# Patient Record
Sex: FEMALE | Race: WHITE | Employment: OTHER
[De-identification: names, ages, dates, MRNs, and addresses within clinical notes are randomized per-mention and may not be internally consistent; named-entity substitution may affect disease eponyms.]

---

## 2020-02-10 ENCOUNTER — HEALTH MAINTENANCE LETTER (OUTPATIENT)
Age: 65
End: 2020-02-10

## 2020-11-16 ENCOUNTER — HEALTH MAINTENANCE LETTER (OUTPATIENT)
Age: 65
End: 2020-11-16

## 2020-12-04 ENCOUNTER — OFFICE VISIT (OUTPATIENT)
Dept: PEDIATRICS | Facility: CLINIC | Age: 65
End: 2020-12-04
Payer: COMMERCIAL

## 2020-12-04 VITALS
OXYGEN SATURATION: 99 % | DIASTOLIC BLOOD PRESSURE: 85 MMHG | HEART RATE: 81 BPM | SYSTOLIC BLOOD PRESSURE: 130 MMHG | TEMPERATURE: 98.6 F | HEIGHT: 70 IN | BODY MASS INDEX: 22.65 KG/M2 | WEIGHT: 158.2 LBS

## 2020-12-04 DIAGNOSIS — Z13.6 CARDIOVASCULAR SCREENING; LDL GOAL LESS THAN 160: ICD-10-CM

## 2020-12-04 DIAGNOSIS — Z12.11 COLON CANCER SCREENING: ICD-10-CM

## 2020-12-04 DIAGNOSIS — Z13.29 SCREENING FOR THYROID DISORDER: ICD-10-CM

## 2020-12-04 DIAGNOSIS — Z11.4 SCREENING FOR HUMAN IMMUNODEFICIENCY VIRUS: ICD-10-CM

## 2020-12-04 DIAGNOSIS — H91.93 HEARING DEFICIT, BILATERAL: ICD-10-CM

## 2020-12-04 DIAGNOSIS — M85.80 OSTEOPENIA, UNSPECIFIED LOCATION: ICD-10-CM

## 2020-12-04 DIAGNOSIS — Z23 NEED FOR SHINGLES VACCINE: ICD-10-CM

## 2020-12-04 DIAGNOSIS — Z12.4 CERVICAL CANCER SCREENING: ICD-10-CM

## 2020-12-04 DIAGNOSIS — Z12.31 ENCOUNTER FOR SCREENING MAMMOGRAM FOR MALIGNANT NEOPLASM OF BREAST: ICD-10-CM

## 2020-12-04 DIAGNOSIS — E04.2 MULTIPLE THYROID NODULES: ICD-10-CM

## 2020-12-04 DIAGNOSIS — R73.01 ELEVATED FASTING GLUCOSE: ICD-10-CM

## 2020-12-04 DIAGNOSIS — Z13.0 SCREENING FOR DEFICIENCY ANEMIA: ICD-10-CM

## 2020-12-04 DIAGNOSIS — Z82.62 FAMILY HISTORY OF OSTEOPOROSIS: ICD-10-CM

## 2020-12-04 DIAGNOSIS — Z23 NEED FOR PNEUMOCOCCAL VACCINATION: ICD-10-CM

## 2020-12-04 DIAGNOSIS — M89.9 DISORDER OF BONE AND CARTILAGE: ICD-10-CM

## 2020-12-04 DIAGNOSIS — Z13.1 SCREENING FOR DIABETES MELLITUS: ICD-10-CM

## 2020-12-04 DIAGNOSIS — Z00.00 ROUTINE GENERAL MEDICAL EXAMINATION AT A HEALTH CARE FACILITY: ICD-10-CM

## 2020-12-04 DIAGNOSIS — Z13.1 SCREENING FOR DIABETES MELLITUS (DM): ICD-10-CM

## 2020-12-04 DIAGNOSIS — Z71.89 ADVANCE CARE PLANNING: ICD-10-CM

## 2020-12-04 DIAGNOSIS — Z78.0 MENOPAUSE: ICD-10-CM

## 2020-12-04 DIAGNOSIS — Z00.00 ENCOUNTER FOR MEDICARE ANNUAL WELLNESS EXAM: Primary | ICD-10-CM

## 2020-12-04 DIAGNOSIS — M94.9 DISORDER OF BONE AND CARTILAGE: ICD-10-CM

## 2020-12-04 DIAGNOSIS — Z83.3 FAMILY HISTORY OF DIABETES MELLITUS (DM): ICD-10-CM

## 2020-12-04 LAB
ALBUMIN SERPL-MCNC: 3.9 G/DL (ref 3.4–5)
ALP SERPL-CCNC: 74 U/L (ref 40–150)
ALT SERPL W P-5'-P-CCNC: 29 U/L (ref 0–50)
ANION GAP SERPL CALCULATED.3IONS-SCNC: 5 MMOL/L (ref 3–14)
AST SERPL W P-5'-P-CCNC: 22 U/L (ref 0–45)
BASOPHILS # BLD AUTO: 0 10E9/L (ref 0–0.2)
BASOPHILS NFR BLD AUTO: 0.8 %
BILIRUB SERPL-MCNC: 0.8 MG/DL (ref 0.2–1.3)
BUN SERPL-MCNC: 15 MG/DL (ref 7–30)
CALCIUM SERPL-MCNC: 8.9 MG/DL (ref 8.5–10.1)
CHLORIDE SERPL-SCNC: 106 MMOL/L (ref 94–109)
CHOLEST SERPL-MCNC: 230 MG/DL
CO2 SERPL-SCNC: 28 MMOL/L (ref 20–32)
CREAT SERPL-MCNC: 0.76 MG/DL (ref 0.52–1.04)
DIFFERENTIAL METHOD BLD: NORMAL
EOSINOPHIL # BLD AUTO: 0.1 10E9/L (ref 0–0.7)
EOSINOPHIL NFR BLD AUTO: 1.6 %
ERYTHROCYTE [DISTWIDTH] IN BLOOD BY AUTOMATED COUNT: 14.6 % (ref 10–15)
GFR SERPL CREATININE-BSD FRML MDRD: 81 ML/MIN/{1.73_M2}
GLUCOSE SERPL-MCNC: 104 MG/DL (ref 70–99)
HBA1C MFR BLD: 5.9 % (ref 0–5.6)
HCT VFR BLD AUTO: 43.6 % (ref 35–47)
HDLC SERPL-MCNC: 76 MG/DL
HGB BLD-MCNC: 14.2 G/DL (ref 11.7–15.7)
HIV 1+2 AB+HIV1 P24 AG SERPL QL IA: NONREACTIVE
IMM GRANULOCYTES # BLD: 0 10E9/L (ref 0–0.4)
IMM GRANULOCYTES NFR BLD: 0.4 %
LDLC SERPL CALC-MCNC: 140 MG/DL
LYMPHOCYTES # BLD AUTO: 1.5 10E9/L (ref 0.8–5.3)
LYMPHOCYTES NFR BLD AUTO: 29.8 %
MCH RBC QN AUTO: 27.7 PG (ref 26.5–33)
MCHC RBC AUTO-ENTMCNC: 32.6 G/DL (ref 31.5–36.5)
MCV RBC AUTO: 85 FL (ref 78–100)
MONOCYTES # BLD AUTO: 0.5 10E9/L (ref 0–1.3)
MONOCYTES NFR BLD AUTO: 9.5 %
NEUTROPHILS # BLD AUTO: 2.9 10E9/L (ref 1.6–8.3)
NEUTROPHILS NFR BLD AUTO: 57.9 %
NONHDLC SERPL-MCNC: 154 MG/DL
PLATELET # BLD AUTO: 208 10E9/L (ref 150–450)
POTASSIUM SERPL-SCNC: 3.9 MMOL/L (ref 3.4–5.3)
PROT SERPL-MCNC: 7.1 G/DL (ref 6.8–8.8)
RBC # BLD AUTO: 5.12 10E12/L (ref 3.8–5.2)
SODIUM SERPL-SCNC: 139 MMOL/L (ref 133–144)
TRIGL SERPL-MCNC: 71 MG/DL
TSH SERPL DL<=0.005 MIU/L-ACNC: 2.37 MU/L (ref 0.4–4)
WBC # BLD AUTO: 5 10E9/L (ref 4–11)

## 2020-12-04 PROCEDURE — 87389 HIV-1 AG W/HIV-1&-2 AB AG IA: CPT | Performed by: FAMILY MEDICINE

## 2020-12-04 PROCEDURE — 85025 COMPLETE CBC W/AUTO DIFF WBC: CPT | Performed by: FAMILY MEDICINE

## 2020-12-04 PROCEDURE — 36415 COLL VENOUS BLD VENIPUNCTURE: CPT | Performed by: FAMILY MEDICINE

## 2020-12-04 PROCEDURE — 80061 LIPID PANEL: CPT | Performed by: FAMILY MEDICINE

## 2020-12-04 PROCEDURE — 83036 HEMOGLOBIN GLYCOSYLATED A1C: CPT | Performed by: FAMILY MEDICINE

## 2020-12-04 PROCEDURE — 80053 COMPREHEN METABOLIC PANEL: CPT | Performed by: FAMILY MEDICINE

## 2020-12-04 PROCEDURE — 84443 ASSAY THYROID STIM HORMONE: CPT | Performed by: FAMILY MEDICINE

## 2020-12-04 PROCEDURE — G0402 INITIAL PREVENTIVE EXAM: HCPCS | Performed by: FAMILY MEDICINE

## 2020-12-04 ASSESSMENT — MIFFLIN-ST. JEOR: SCORE: 1334.9

## 2020-12-04 NOTE — PATIENT INSTRUCTIONS
Get the vision screening done today  Schedule for DEXA, THYROID US and mammogram  Get the labs- FIT kit today    Patient Education   Personalized Prevention Plan  You are due for the preventive services outlined below.  Your care team is available to assist you in scheduling these services.  If you have already completed any of these items, please share that information with your care team to update in your medical record.  Health Maintenance Due   Topic Date Due     Discuss Advance Care Planning  1955     HIV Screening  02/07/1970     Zoster (Shingles) Vaccine (1 of 2) 02/07/2005     Colorectal Cancer Screening  12/01/2018     Mammogram  12/08/2018     PHQ-2  01/01/2020     FALL RISK ASSESSMENT  02/07/2020     Pneumococcal Vaccine (1 of 1 - PPSV23) 02/07/2020     Flu Vaccine (1) 09/01/2020        Patient Education     Zoster Vaccine, Recombinant injection  Brand Name: SHINGRIX  What is this medicine?  ZOSTER VACCINE (ZOS ter vak SEEN) is used to prevent shingles in adults 50 years old and over. This vaccine is not used to treat shingles or nerve pain from shingles.  How should I use this medicine?  This vaccine is for injection in a muscle. It is given by a health care professional.  Talk to your pediatrician regarding the use of this medicine in children. This medicine is not approved for use in children.  What side effects may I notice from receiving this medicine?  Side effects that you should report to your doctor or health care professional as soon as possible:    allergic reactions like skin rash, itching or hives, swelling of the face, lips, or tongue    breathing problems  Side effects that usually do not require medical attention (report these to your doctor or health care professional if they continue or are bothersome):    chills    headache    fever    nausea, vomiting    redness, warmth, pain, swelling or itching at site where injected    tiredness  What may interact with this  medicine?      medicines that suppress your immune system    medicines to treat cancer    steroid medicines like prednisone or cortisone  What if I miss a dose?  Keep appointments for follow-up (booster) doses as directed. It is important not to miss your dose. Call your doctor or health care professional if you are unable to keep an appointment.  Where should I keep my medicine?  This vaccine is only given in a clinic, pharmacy, doctor's office, or other health care setting and will not be stored at home.  What should I tell my health care provider before I take this medicine?  They need to know if you have any of these conditions:    blood disorders or disease    cancer like leukemia or lymphoma    immune system problems or therapy    an unusual or allergic reaction to vaccines, other medications, foods, dyes, or preservatives    pregnant or trying to get pregnant    breast-feeding  What should I watch for while using this medicine?  Visit your doctor for regular check ups.  This vaccine, like all vaccines, may not fully protect everyone.  NOTE:This sheet is a summary. It may not cover all possible information. If you have questions about this medicine, talk to your doctor, pharmacist, or health care provider. Copyright  2020 [employer]           Patient Education     Pneumococcal Vaccine  Pneumococcal disease    Pneumococcal disease is caused by bacteria (Streptococcus pneumoniae). This germ is easily spread when someone with the bacteria coughs, sneezes, laughs, or talks. You can get this disease more than once. This is because there are many types (strains) of the bacteria. Some strains are also resistant to treatment with antibiotics.  There are different kinds of pneumococcal disease. What kind you have depends on what part of the body is infected. They include:    Pneumonia. This is an infection in the lungs.    Meningitis. This is an infection of the covering of the brain and spinal cord.    Otitis media.  This is an infection of the middle ear.    Bacteremia or septicemia. This is an infection in the blood.  Anyone can get pneumococcal disease, but it can be life-threatening for people in high-risk groups. Thousands of people die from this disease each year. Thousands more become seriously ill.  The vaccine  Those at high risk for pneumococcal disease include:    People 65 and older    Infants younger than 2 years    People with long-term (chronic) health problems such as diabetes, lung or heart disease, liver disease, sickle cell disease, or alcoholism    People who have a cochlear implant    People who have a weakened immune system from cancer, HIV, or medicines    People who live in nursing homes or other long-term-care facilities    People who smoke or have asthma   For people at risk, the pneumococcal vaccines are the best way to keep from getting the disease. The vaccine comes in two types. The vaccine that s right for you depends on your risk factors and your age. Both vaccines are safe and work well Talk with your healthcare provider about which vaccine is best for you and when you should get it. The vaccines are given as shots (injections). This can be done at your healthcare provider's office or a health clinic. Drugstores, senior centers, and workplaces also often offer vaccines. Ask your healthcare provider if you have questions.  They are given as follows, or as directed by your healthcare provider:    Infants to children younger than 2 years. The CDC recommends a 4-dose series given 2 to several months apart.      Ages 2 to 64 years diagnosed with certain health conditions.    Adults 19 to 64 years who are cigarette smokers or who have certain health conditions. Your healthcare provider will tell you the number of doses you need based on your health condition.    Adults 65 years and older. If you have never had a pneumococcal vaccine or don t know if you have had a vaccine, you may need 2 vaccinations a  year apart. If you had a vaccination before you were 65 years old, ask your healthcare provider to tell you the number of doses you need and when you should have them.  [employer] last reviewed this educational content on 11/1/2016 2000-2020 [employer]. [address] [place] [address], [place] [address]. All rights reserved. This information is not intended as a substitute for professional medical care. Always follow your healthcare professional's instructions.

## 2020-12-04 NOTE — PROGRESS NOTES
"  SUBJECTIVE:   Angelique Anaya is a 65 year old female who presents for Preventive Visit.  Patient was last seen by this provider in 2016  She is here to reestablish care and for annual wellness exam  Patient was followed up at the [facility] in the past 3 years      Patient has been advised of split billing requirements and indicates understanding: Yes  Are you in the first 12 months of your Medicare Part B coverage?  Yes,  Visual Acuity:  Right Eye: 10/20   Left Eye: 10/25  Both Eyes: 10/25    Physical Health:    In general, how would you rate your overall physical health? good    Outside of work, how many days during the week do you exercise? 4-5 days/week    Outside of work, approximately how many minutes a day do you exercise?45-60 minutes    If you drink alcohol do you typically have >3 drinks per day or >7 drinks per week? No    Do you usually eat at least 4 servings of fruit and vegetables a day, include whole grains & fiber and avoid regularly eating high fat or \"junk\" foods? Yes    Do you have any problems taking medications regularly?  No    Do you have any side effects from medications? none    Needs assistance for the following daily activities: no assistance needed    Which of the following safety concerns are present in your home?  none identified     Hearing impairment: Yes, Difficulty following a conversation in a noisy restaurant or crowded room.    In the past 6 months, have you been bothered by leaking of urine? no    Mental Health:    In general, how would you rate your overall mental or emotional health? good  PHQ-2 Score:      Do you feel safe in your environment? Yes    Have you ever done Advance Care Planning? (For example, a Health Directive, POLST, or a discussion with a medical provider or your loved ones about your wishes): Yes, patient states has an Advance Care Planning document and will bring a copy to the clinic.    Additional concerns to address?  No    Fall " "risk:  Fallen 2 or more times in the past year?: No  Any fall with injury in the past year?: No  click delete button to remove this line now  Cognitive Screenin) Repeat 3 items (Leader, Season, Table)    2) Clock draw:  :00966::\"NORMAL\"}  3) 3 item recall: Recalls 3 objects  Results: 3 items recalled: COGNITIVE IMPAIRMENT LESS LIKELY    Mini-CogTM Copyright PRICE Thurman. Licensed by the author for use in [facility]; reprinted with permission (jessee@[facility]). All rights reserved.      Do you have sleep apnea, excessive snoring or daytime drowsiness?: no            Reviewed and updated as needed this visit by clinical staff                 Reviewed and updated as needed this visit by Provider                Social History     Tobacco Use     Smoking status: Never Smoker     Smokeless tobacco: Never Used   Substance Use Topics     Alcohol use: Yes     Comment: very little                           Current providers sharing in care for this patient include:   Patient Care Team:  Jodie Walton MD as PCP - General (Family Practice)    The following health maintenance items are reviewed in Epic and correct as of today:  Health Maintenance   Topic Date Due     ADVANCE CARE PLANNING  1955     HIV SCREENING  1970     ZOSTER IMMUNIZATION (1 of 2) 2005     COLORECTAL CANCER SCREENING  2018     MAMMO SCREENING  2018     PHQ-2  2020     MEDICARE ANNUAL WELLNESS VISIT  2020     FALL RISK ASSESSMENT  2020     Pneumococcal Vaccine: 65+ Years (1 of 1 - PPSV23) 2020     INFLUENZA VACCINE (1) 2020     LIPID  2025     DTAP/TDAP/TD IMMUNIZATION (3 - Td) 2026     DEXA  2031     HEPATITIS C SCREENING  Completed     Pneumococcal Vaccine: Pediatrics (0 to 5 Years) and At-Risk Patients (6 to 64 Years)  Aged Out     IPV IMMUNIZATION  Aged Out     MENINGITIS IMMUNIZATION  Aged Out     HEPATITIS B IMMUNIZATION  Aged Out     Lab work is in " process  Labs reviewed in EPIC  BP Readings from Last 3 Encounters:   12/04/20 130/85   12/09/16 118/60   11/18/16 132/71    Wt Readings from Last 3 Encounters:   12/04/20 71.8 kg (158 lb 3.2 oz)   12/09/16 77.6 kg (171 lb 1.6 oz)   11/18/16 77.1 kg (170 lb)                  Patient Active Problem List   Diagnosis     CARDIOVASCULAR SCREENING; LDL GOAL LESS THAN 160     Family history of diabetes mellitus (DM)     Right thyroid nodule     Elevated fasting glucose     Cataract     Multiple thyroid nodules     Family history of osteoporosis     Family history of ischemic heart disease     Osteopenia     Past Surgical History:   Procedure Laterality Date     BIOPSY  2016    thyroid     COLONOSCOPY  12/2008     EYE SURGERY  2016    cataract removal       Social History     Tobacco Use     Smoking status: Never Smoker     Smokeless tobacco: Never Used   Substance Use Topics     Alcohol use: Yes     Comment: very little     Family History   Problem Relation Age of Onset     Hypertension Mother      Diabetes Father      Heart Disease Father 58        multiple MI, stents     Cancer Sister         skinm ,melanoma         Current Outpatient Medications   Medication Sig Dispense Refill     Ascorbic Acid (VITAMIN C PO) Take by mouth daily       Calcium Carbonate-Vitamin D (CALCIUM + D PO) Take by mouth daily       Cholecalciferol (VITAMIN D3 PO) Take 2,000 Units by mouth daily       LUTEIN PO Take by mouth daily       Magnesium Hydroxide (MAGNESIA PO) Take by mouth daily       Omega-3 Fatty Acids (OMEGA-3 FISH OIL PO) Take by mouth daily       vitamin  B complex with vitamin C (VITAMIN  B COMPLEX) TABS Take 1 tablet by mouth daily       Allergies   Allergen Reactions     Penicillins Other (See Comments)     As child     Recent Labs   Lab Test 12/04/20  0958 12/09/16  0918 01/04/16  1315 11/18/14  0737 11/18/14  0737   A1C 5.9* 6.0 5.8  --   --    * 107*  --   --  85   HDL 76 72  --   --  62   TRIG 71 47  --   --  57  "  ALT 29 37  --   --   --    CR 0.76 0.77  --    < >  --    GFRESTIMATED 81 76  --    < >  --    GFRESTBLACK >90 >90  African American GFR Calc    --    < >  --    POTASSIUM 3.9 4.8  --    < >  --    TSH 2.37 1.27 1.02  --  2.14    < > = values in this interval not displayed.      Pneumonia Vaccine: Patient declined vaccination today  Mammogram Screening: Mammogram Screening: Patient over age 50, mutual decision to screen reflected in health maintenance.  History of abnormal Pap smear: NO - age 65 - see link Cervical Cytology Screening Guidelines  Last 3 Pap and HPV Results:   PAP / HPV 11/17/2014   PAP NIL       ROS:  CONSTITUTIONAL: NEGATIVE for fever, chills, change in weight  INTEGUMENTARY/SKIN: NEGATIVE for worrisome rashes, moles or lesions  EYES: NEGATIVE for vision changes or irritation  ENT/MOUTH: NEGATIVE for ear, mouth and throat problems  ENT/MOUTH: History of bilateral hearing deficit  RESP: NEGATIVE for significant cough or SOB  BREAST: NEGATIVE for masses, tenderness or discharge  CV: NEGATIVE for chest pain, palpitations or peripheral edema  GI: NEGATIVE for nausea, abdominal pain, heartburn, or change in bowel habits  : Postmenopausal  MUSCULOSKELETAL: NEGATIVE for significant arthralgias or myalgia  NEURO: NEGATIVE for weakness, dizziness or paresthesias  ENDOCRINE: NEGATIVE for temperature intolerance, skin/hair changes  History of multiple thyroid nodules  HEME/ALLERGY/IMMUNE: NEGATIVE for bleeding problems  PSYCHIATRIC: NEGATIVE for changes in mood or affect    OBJECTIVE:   There were no vitals taken for this visit. Estimated body mass index is 24.9 kg/m  as calculated from the following:    Height as of 12/9/16: 1.765 m (5' 9.5\").    Weight as of 12/9/16: 77.6 kg (171 lb 1.6 oz).  EXAM:   GENERAL APPEARANCE: healthy, alert and no distress  EYES: Eyes grossly normal to inspection, PERRL and conjunctivae and sclerae normal  HENT: ear canals and TM's normal, nose and mouth without ulcers or " lesions, oropharynx clear and oral mucous membranes moist  NECK: no adenopathy, no asymmetry, masses, or scars and thyroid normal to palpation  RESP: lungs clear to auscultation - no rales, rhonchi or wheezes  BREAST: normal without masses, tenderness or nipple discharge and no palpable axillary masses or adenopathy  CV: regular rate and rhythm, normal S1 S2, no S3 or S4, no murmur, click or rub, no peripheral edema and peripheral pulses strong  ABDOMEN: soft, nontender, no hepatosplenomegaly, no masses and bowel sounds normal   (female): normal female external genitalia, normal urethral meatus, vaginal mucosal atrophy noted, normal cervix, adnexae, and uterus without masses or abnormal discharge  MS: no musculoskeletal defects are noted and gait is age appropriate without ataxia  SKIN: no suspicious lesions or rashes  NEURO: Normal strength and tone, sensory exam grossly normal, mentation intact and speech normal  PSYCH: mentation appears normal and affect normal/bright    Diagnostic Test Results:  Labs reviewed in Epic  Results for orders placed or performed in visit on 12/04/20 (from the past 24 hour(s))   Hemoglobin A1c   Result Value Ref Range    Hemoglobin A1C 5.9 (H) 0 - 5.6 %       ASSESSMENT / PLAN:   1. Encounter for Medicare annual wellness exam  Discussed on regular exercises, daily calcium intake, healthy eating, self breast exams monthly and routine dental checks      2. Advance care planning  Patient has documents at home, will bring us a copy at the next visit      3. Cervical cancer screening  No more Pap screening after 65    4. Encounter for screening mammogram for malignant neoplasm of breast  Reviewed normal mammogram through care everywhere that was done in March 2019  - MA Screening Digital Bilateral; Future    5. Colon cancer screening  Last colonoscopy was in 2008 from [facility]  Patient is not interested in repeating colonoscopies in the future due to previous difficult colonoscopy   will  start getting the annual screening    - Fecal colorectal cancer screen (FIT); Future    6. Need for shingles vaccine  Recommended  patient to have it done at the pharmacy after checking with insurance for coverage.      7. Need for pneumococcal vaccination  Patient declined this year  Information attached to the after visit summary, wants to have it done later    8. Elevated fasting glucose    - Hemoglobin A1c    9. Screening for diabetes mellitus (DM)    - Hemoglobin A1c    10. Osteopenia, unspecified location  Continue with calcium and vitamin D supplements, recheck DEXA this year, last one was in 2016  - DX Hip/Pelvis/Spine; Future    11. Disorder of bone and cartilage  as above    - DX Hip/Pelvis/Spine; Future    12. Menopause    - DX Hip/Pelvis/Spine; Future    13. Multiple thyroid nodules  Last thyroid ultrasound was in 2016 status post biopsy-benign  Repeat thyroid ultrasound this year  - US Thyroid; Future    14. Family history of osteoporosis  Mother and sister, will get a DEXA scan this year    15. CARDIOVASCULAR SCREENING; LDL GOAL LESS THAN 160  LDL Cholesterol Calculated   Date Value Ref Range Status   12/04/2020 140 (H) <100 mg/dL Final     Comment:     Above desirable:  100-129 mg/dl  Borderline High:  130-159 mg/dL  High:             160-189 mg/dL  Very high:       >189 mg/dl           16. Screening for human immunodeficiency virus    - HIV Antigen Antibody Combo    17. Hearing deficit, bilateral  Patient is interested in getting the hearing aids, wants to wait for the audiology consult, will call for refill when needed      Patient has been advised of split billing requirements and indicates understanding: Yes    COUNSELING:  Reviewed preventive health counseling, as reflected in patient instructions  Special attention given to:       Regular exercise       Healthy diet/nutrition       Vision screening       Hearing screening       Dental care       Bladder control       Fall risk prevention        "Immunizations    Declined: Influenza, Pneumococcal and Zoster due to Other                Osteoporosis Prevention/Bone Health       Colon cancer screening       HIV screening for high risk patient       ([place])menopause management       The 10-year ASCVD risk score (Farrukh RODRIGUEZ Jr., et al., 2013) is: 5.4%    Values used to calculate the score:      Age: 65 years      Sex: Female      Is Non- : No      Diabetic: No      Tobacco smoker: No      Systolic Blood Pressure: 130 mmHg      Is BP treated: No      HDL Cholesterol: 76 mg/dL      Total Cholesterol: 230 mg/dL       Advanced Planning     Estimated body mass index is 24.9 kg/m  as calculated from the following:    Height as of 12/9/16: 1.765 m (5' 9.5\").    Weight as of 12/9/16: 77.6 kg (171 lb 1.6 oz).        She reports that she has never smoked. She has never used smokeless tobacco.    Appropriate preventive services were discussed with this patient, including applicable screening as appropriate for cardiovascular disease, diabetes, osteopenia/osteoporosis, and glaucoma.  As appropriate for age/gender, discussed screening for colorectal cancer, prostate cancer, breast cancer, and cervical cancer. Checklist reviewing preventive services available has been given to the patient.    Reviewed patients plan of care and provided an AVS. The Intermediate Care Plan ( asthma action plan, low back pain action plan, and migraine action plan) for Angelique Rosenberg meets the Care Plan requirement. This Care Plan has been established and reviewed with the Patient.    Counseling Resources:  ATP IV Guidelines  Pooled Cohorts Equation Calculator  Breast Cancer Risk Calculator  BRCA-Related Cancer Risk Assessment: FHS-7 Tool  FRAX Risk Assessment  ICSI Preventive Guidelines  Dietary Guidelines for Americans, 2010  USDA's MyPlate  ASA Prophylaxis  Lung CA Screening    Jodie Walton MD  [facility]  Chart documentation done in part with [employer]" Voice recognition Software. Although reviewed after completion, some word and grammatical error may remain.

## 2020-12-07 NOTE — RESULT ENCOUNTER NOTE
Louis Rosenberg,  Your test results for HIV screening is negative, this is good and reassuring.   Let me know if you have any questions. Take care.  Jodie Walton MD

## 2020-12-15 DIAGNOSIS — Z12.11 COLON CANCER SCREENING: ICD-10-CM

## 2020-12-15 PROCEDURE — 82274 ASSAY TEST FOR BLOOD FECAL: CPT | Performed by: FAMILY MEDICINE

## 2020-12-17 LAB — HEMOCCULT STL QL IA: NEGATIVE

## 2020-12-18 NOTE — RESULT ENCOUNTER NOTE
Louis Rosenberg,   Your stool test results for colon cancer screening is negative, this is reassuring.   Let us recheck this next year.  Jodie Walton MD

## 2021-01-15 ENCOUNTER — ANCILLARY PROCEDURE (OUTPATIENT)
Dept: BONE DENSITY | Facility: CLINIC | Age: 66
End: 2021-01-15
Attending: FAMILY MEDICINE
Payer: COMMERCIAL

## 2021-01-15 ENCOUNTER — ANCILLARY PROCEDURE (OUTPATIENT)
Dept: ULTRASOUND IMAGING | Facility: CLINIC | Age: 66
End: 2021-01-15
Attending: FAMILY MEDICINE
Payer: COMMERCIAL

## 2021-01-15 ENCOUNTER — ANCILLARY PROCEDURE (OUTPATIENT)
Dept: MAMMOGRAPHY | Facility: CLINIC | Age: 66
End: 2021-01-15
Attending: FAMILY MEDICINE
Payer: COMMERCIAL

## 2021-01-15 DIAGNOSIS — Z78.0 MENOPAUSE: ICD-10-CM

## 2021-01-15 DIAGNOSIS — M94.9 DISORDER OF BONE AND CARTILAGE: ICD-10-CM

## 2021-01-15 DIAGNOSIS — M89.9 DISORDER OF BONE AND CARTILAGE: ICD-10-CM

## 2021-01-15 DIAGNOSIS — Z12.31 ENCOUNTER FOR SCREENING MAMMOGRAM FOR MALIGNANT NEOPLASM OF BREAST: ICD-10-CM

## 2021-01-15 DIAGNOSIS — M85.80 OSTEOPENIA, UNSPECIFIED LOCATION: ICD-10-CM

## 2021-01-15 DIAGNOSIS — Z82.62 FAMILY HISTORY OF OSTEOPOROSIS: ICD-10-CM

## 2021-01-15 DIAGNOSIS — E04.2 MULTIPLE THYROID NODULES: ICD-10-CM

## 2021-01-15 PROCEDURE — 77080 DXA BONE DENSITY AXIAL: CPT | Performed by: RADIOLOGY

## 2021-01-15 PROCEDURE — 77067 SCR MAMMO BI INCL CAD: CPT | Mod: GC | Performed by: RADIOLOGY

## 2021-01-15 PROCEDURE — 76536 US EXAM OF HEAD AND NECK: CPT | Performed by: RADIOLOGY

## 2021-01-15 NOTE — RESULT ENCOUNTER NOTE
Dear Angelique Rosenberg,  Your DEXA showed reduced bone density- osteopenia, that is in between normal bones and osteoporosis. Please make sure to take CALTRATE-D twice daily along with weight bearing exercises including walking. We will repeat the DEXA in 2 years for recheck.   Let me know if you have any questions. Take care.  Jodie Walton MD

## 2021-01-20 DIAGNOSIS — E04.1 RIGHT THYROID NODULE: Primary | ICD-10-CM

## 2021-01-21 NOTE — RESULT ENCOUNTER NOTE
Louis Rosenberg,  The thyroid scan showed multiple nodules as it was before with the one on the right side slightly enlarged in size compared to previous years, suggesting the need to have a needle biopsy for further evaluation.  I placed the order for the same, please call the [facility] at [phone] to schedule for the thyroid  Biopsy.   Let me know if you have any questions. Take care.  Jodie Walton MD

## 2021-01-22 DIAGNOSIS — Z11.59 ENCOUNTER FOR SCREENING FOR OTHER VIRAL DISEASES: Primary | ICD-10-CM

## 2021-01-25 ENCOUNTER — OFFICE VISIT (OUTPATIENT)
Dept: LAB | Facility: CLINIC | Age: 66
End: 2021-01-25
Payer: COMMERCIAL

## 2021-01-25 DIAGNOSIS — Z11.59 ENCOUNTER FOR SCREENING FOR OTHER VIRAL DISEASES: ICD-10-CM

## 2021-01-25 LAB
LABORATORY COMMENT REPORT: NORMAL
SARS-COV-2 RNA RESP QL NAA+PROBE: NEGATIVE
SARS-COV-2 RNA RESP QL NAA+PROBE: NORMAL
SPECIMEN SOURCE: NORMAL
SPECIMEN SOURCE: NORMAL

## 2021-01-25 PROCEDURE — U0003 INFECTIOUS AGENT DETECTION BY NUCLEIC ACID (DNA OR RNA); SEVERE ACUTE RESPIRATORY SYNDROME CORONAVIRUS 2 (SARS-COV-2) (CORONAVIRUS DISEASE [COVID-19]), AMPLIFIED PROBE TECHNIQUE, MAKING USE OF HIGH THROUGHPUT TECHNOLOGIES AS DESCRIBED BY CMS-2020-01-R: HCPCS | Performed by: FAMILY MEDICINE

## 2021-01-25 PROCEDURE — U0005 INFEC AGEN DETEC AMPLI PROBE: HCPCS | Performed by: FAMILY MEDICINE

## 2021-01-28 ENCOUNTER — ANCILLARY PROCEDURE (OUTPATIENT)
Dept: ULTRASOUND IMAGING | Facility: CLINIC | Age: 66
End: 2021-01-28
Attending: FAMILY MEDICINE
Payer: COMMERCIAL

## 2021-01-28 DIAGNOSIS — E04.1 RIGHT THYROID NODULE: ICD-10-CM

## 2021-01-28 PROCEDURE — 88173 CYTOPATH EVAL FNA REPORT: CPT | Mod: 26 | Performed by: PATHOLOGY

## 2021-01-28 PROCEDURE — 10005 FNA BX W/US GDN 1ST LES: CPT | Performed by: RADIOLOGY

## 2021-01-29 LAB — COPATH REPORT: NORMAL

## 2021-02-01 NOTE — RESULT ENCOUNTER NOTE
Dear Angelique Rosenberg,  Your thyroid biopsy came back benign-this is reassuring, and does not need further evaluation at this time.  We will continue to monitor with ultrasound every 2 to 3 years or sooner if needed.   Let me know if you have any questions. Take care.  Jodie Walton MD

## 2021-05-03 ENCOUNTER — NURSE TRIAGE (OUTPATIENT)
Dept: FAMILY MEDICINE | Facility: CLINIC | Age: 66
End: 2021-05-03

## 2021-05-03 NOTE — TELEPHONE ENCOUNTER
"Angelique Rosenberg called in to report this morning episode X1  Lower right abdomen like an \"ovary\" pain  Felt burning inside like cayenne pepper  Normal urinary and bowel today, no other symptoms  Since >60 protocol advising see today in office. Offered appointment today with provider at [facility],    Angelique Rosenberg prefers to wait to see PCP Dr. Walton as her pain is currently resolved, same day appointment scheduled first available in 2 days 05/05/21.          Angelique Rosenberg verbalized understanding to go to [facility] if develops fever, abd pain >2 hours, N/V/D or any other acute worsening of symptoms.    Cata Piedra RN, BSN, CMSRN  [facility]      Additional Information    Negative: Passed out (i.e., fainted, collapsed and was not responding)    Negative: Shock suspected (e.g., cold/pale/clammy skin, too weak to stand, low BP, rapid pulse)    Negative: Sounds like a life-threatening emergency to the triager    Negative: Chest pain    Negative: Pain is mainly in upper abdomen (if needed ask: 'is it mainly above the belly button?')    Negative: Abdominal pain and pregnant > 20 weeks    Negative: Abdominal pain and pregnant < 20 weeks    Negative: SEVERE abdominal pain (e.g., excruciating)    Negative: Vomiting red blood or black (coffee ground) material    Negative: Bloody, black, or tarry bowel movements    Negative: Constant abdominal pain lasting > 2 hours    Negative: Vomiting bile (green color)    Negative: Patient sounds very sick or weak to the triager    Negative: Vomiting and abdomen looks much more swollen than usual    Negative: White of the eyes have turned yellow (i.e., jaundice)    Negative: Blood in urine (red, pink, or tea-colored)    Negative: Fever > 103 F (39.4 C)    Negative: Fever > 101 F (38.3 C) and over 60 years of age    Negative: Fever > 100.0 F (37.8 C) and has diabetes mellitus or a weak immune system (e.g., HIV positive, cancer chemotherapy, organ transplant, " "splenectomy, chronic steroids)    Negative: Fever > 100.0 F (37.8 C) and bedridden (e.g., nursing home patient, stroke, chronic illness, recovering from surgery)    Negative: Pregnant or could be pregnant (i.e., missed last menstrual period)    Age > 60 years    Answer Assessment - Initial Assessment Questions  1. LOCATION: \"Where does it hurt?\"      Lower abdomen like ovaries   2. RADIATION: \"Does the pain shoot anywhere else?\" (e.g., chest, back)     none  3. ONSET: \"When did the pain begin?\" (e.g., minutes, hours or days ago)      This morning  4. SUDDEN: \"Gradual or sudden onset?\"      sudden  5. PATTERN \"Does the pain come and go, or is it constant?\"     - If constant: \"Is it getting better, staying the same, or worsening?\"       (Note: Constant means the pain never goes away completely; most serious pain is constant and it progresses)      - If intermittent: \"How long does it last?\" \"Do you have pain now?\"      (Note: Intermittent means the pain goes away completely between bouts)      One episode   6. SEVERITY: \"How bad is the pain?\"  (e.g., Scale 1-10; mild, moderate, or severe)    - MILD (1-3): doesn't interfere with normal activities, abdomen soft and not tender to touch     - MODERATE (4-7): interferes with normal activities or awakens from sleep, tender to touch     - SEVERE (8-10): excruciating pain, doubled over, unable to do any normal activities       Moderate   7. RECURRENT SYMPTOM: \"Have you ever had this type of abdominal pain before?\" If so, ask: \"When was the last time?\" and \"What happened that time?\"       *No Answer*  8. CAUSE: \"What do you think is causing the abdominal pain?\"      someething ovary related  9. RELIEVING/AGGRAVATING FACTORS: \"What makes it better or worse?\" (e.g., movement, antacids, bowel movement)      Bath helped a little  10. OTHER SYMPTOMS: \"Has there been any vomiting, diarrhea, constipation, or urine problems?\"       none    Protocols used: ABDOMINAL PAIN - " FEMALE-A-OH

## 2021-09-18 ENCOUNTER — HEALTH MAINTENANCE LETTER (OUTPATIENT)
Age: 66
End: 2021-09-18

## 2021-09-27 ENCOUNTER — VIRTUAL VISIT (OUTPATIENT)
Dept: FAMILY MEDICINE | Facility: CLINIC | Age: 66
End: 2021-09-27
Payer: COMMERCIAL

## 2021-09-27 DIAGNOSIS — B02.9 HERPES ZOSTER WITHOUT COMPLICATION: Primary | ICD-10-CM

## 2021-09-27 PROCEDURE — 99213 OFFICE O/P EST LOW 20 MIN: CPT | Mod: 95 | Performed by: FAMILY MEDICINE

## 2021-09-27 RX ORDER — VALACYCLOVIR HYDROCHLORIDE 1 G/1
1000 TABLET, FILM COATED ORAL 3 TIMES DAILY
Qty: 21 TABLET | Refills: 0 | Status: SHIPPED | OUTPATIENT
Start: 2021-09-27 | End: 2021-10-04

## 2021-09-27 NOTE — PROGRESS NOTES
Angelique Rosenberg is a 66 year old who is being evaluated via a billable video visit.      How would you like to obtain your AVS? MyChart  If the video visit is dropped, the invitation should be resent by: Text to cell phone: see epic  Will anyone else be joining your video visit? No    Video Start Time: 9:45 AM    Assessment & Plan     Herpes zoster without complication  Reviewed the etiology of herpes zoster, prognosis and potential complications including postherpetic neuralgia  Education information attached to AVS  Recommended to use topical calamine lotion 3 times daily as needed over the rash, take over-the-counter ibuprofen 400 mg 3 times daily as needed for pain, start on Valtrex 1000 mg 3 times a day for 1 week  We will follow-up in the clinic if no improvement noted in 7 to 10 days or sooner if needed for concerns for focal neurological symptoms  Dosing and potential medication side effects discussed.  Patient verbalised understanding and is agreeable to the plan.    - valACYclovir (VALTREX) 1000 mg tablet; Take 1 tablet (1,000 mg) by mouth 3 times daily for 7 days      [id number]}     Chart documentation done in part with Dragon Voice recognition Software. Although reviewed after completion, some word and grammatical error may remain.    See Patient Instructions    Return in about 1 week (around 10/4/2021), or if symptoms worsen or fail to improve.    Jodie Walton MD  [facility]   Angelique Rosenberg is a 66 year old who presents for the following health issues {  Patient is here for a video visit instead of in person visit due to the current COVID-19 pandemic.  Patient with no significant past medical history is here for a video visit with concerns of having a erythematous painful rash on the right upper chest, right mid back and posterior ear.  [place] and the outside temperature was high at 90  F.  3 days later, she developed  a red rash on the chest of the back, associated with pain over the frontal scalp and with no associated concerns for fever, chills, double or blurred vision, photophobia, tingling, numbness or weakness of the extremities.  Patient denies history of shingles in the past, has not had shingles vaccination yet.  She has been using episil vinegar topically which she thinks was taking care of the pain    HPI         Review of Systems   CONSTITUTIONAL: NEGATIVE for fever, chills, change in weight  INTEGUMENTARY/SKIN: as above  EYES: NEGATIVE for vision changes or irritation  ENT/MOUTH: NEGATIVE for ear, mouth and throat problems  RESP: NEGATIVE for significant cough or SOB  CV: NEGATIVE for chest pain, palpitations or peripheral edema  MUSCULOSKELETAL: NEGATIVE for significant arthralgias or myalgia  NEURO: NEGATIVE for weakness, dizziness or paresthesias  PSYCHIATRIC: NEGATIVE for changes in mood or affect      Objective           Vitals:  No vitals were obtained today due to virtual visit.    Physical Exam   GENERAL: Healthy, alert and no distress  EYES: Eyes grossly normal to inspection  RESP: No audible wheeze, cough, or visible cyanosis.  No visible retractions or increased work of breathing.    SKIN: Erythematous vesicular rash in groups located in the right upper anterior chest, right mid back, and the right external auricle  NEURO: Cranial nerves grossly intact.  Mentation and speech appropriate for age.  PSYCH: Mentation appears normal, affect normal/bright, judgement and insight intact, normal speech and appearance well-groomed.                Video-Visit Details    Type of service:  Video Visit    Video End Time:9:55am    Originating Location (pt. Location): Home    Distant Location (provider location):  [facility]     Platform used for Video Visit: [employer]

## 2021-09-27 NOTE — PATIENT INSTRUCTIONS
Start on Valtrex 1000 mg 3 times a day for 7 days  Take ibuprofen over-the-counter 400 mg up to 3 times a day as needed for pain  Follow-up if you do not feel any better in 7 to 10 days or sooner if needed  Patient Education     Shingles  Shingles is a viral infection caused by the same virus that causes chicken pox. Anyone who has had chicken pox may get shingles later in life. The virus stays in the body, but remains asleep (dormant). Shingles often occurs in older persons or persons with lowered immunity. But it can affect anyone at any age.  Shingles starts as a tingling patch of skin on one side of the body. Small, painful blisters may then appear. The rash rarely spreads to other parts of the body.  Exposure to shingles can't cause shingles. However, it can cause chicken pox in anyone who has not had chicken pox or has not been vaccinated. The contagious period ends when all blisters have crusted over, generally 1 to 2 weeks after the illness starts.  After the blisters heal, the affected skin may be sensitive or painful for weeks or months, gradually resolving over time. But, sometimes this can last longer and be permanent (called postherpetic neuralgia.)  Shingles vaccines are available. Vaccination can help prevent shingles or make it less painful. It is generally recommended for adults older than 50, even if you've had singles in the past. Talk with your healthcare provider about when to get vaccinated and which vaccine is best for you.  Home care    Medicines may be prescribed to help relieve pain. Take these medicines as directed. Ask your healthcare provider or pharmacist before using over-the-counter medicines for helping treat pain and itching.    In certain cases, antiviral medicines may be prescribed to reduce pain, shorten the illness, and prevent neuralgia. Take these medicines as directed.    Compresses made from a solution of cool water mixed with cornstarch or baking soda may help relieve pain  and itching.     Gently wash skin daily with soap and water to help prevent infection. Be certain to rinse off all of the soap, which can be irritating.    Trim fingernails and try not to scratch. Scratching the sores may leave scars.    Stay home from work or school until all blisters have formed a crust and you are no longer contagious.  Follow-up care  Follow up with your healthcare provider, or as directed.  When to seek medical advice    Fever of 100.4 F (38 C) or higher, or as directed by your healthcare provider    Affected skin is on the face or neck and any of the following occur:  ? Headache  ? Eye pain  ? Changes in vision  ? Sores near the eye  ? Weakness of facial muscles    Blisters occurring on new areas of the body    Pain, redness, or swelling of a joint    Signs of skin infection: colored drainage from the sores, warmth, increasing redness, fever, or increasing pain  [employer] last reviewed this educational content on 4/1/2018 2000-2021 [employer]. All rights reserved. This information is not intended as a substitute for professional medical care. Always follow your healthcare professional's instructions.           Patient Education     Shingles (Herpes Zoster)     Talk to your healthcare provider about the shingles vaccine.   Shingles is also called herpes zoster. It's a painful skin rash caused by the herpes zoster virus. This is the same virus that causes chickenpox. After a person has chickenpox, the virus stays inactive in the nerve cells. Years later, the virus can become active again and travel along the nerve to the skin. Most people have shingles only once. But it's possible to have it more than once.   Who is at risk for shingles?  Anyone who has ever had chickenpox can get shingles. But your risk is greater if you:     Are age 50 or older    Have an illness that weakens your immune system, such as HIV/AIDS    Have cancer, especially Hodgkin disease or lymphoma    Take  medicines that weaken your immune system  What are the symptoms of shingles?    The first sign of shingles is often pain, burning, tingling, or itching on one part of your face or body. You may also feel as if you have the flu, with fever and chills.    A red rash with small blisters appears in a few days. The rash may look as follows:   ? The blisters can occur anywhere, but they re most common on the back, chest, or belly (abdomen).  ? They usually appear on only one side of the body, spreading along the nerve pathway where the virus is reactivating.   ? The rash can also form around an eye, along one side of the face or neck, or in the mouth.  ? In a few people, often those with a weak immune system, shingles appear on more than one part of the body at once.    After a few days, the blisters become dry and form a crust. The crust falls off in days to weeks. The blisters generally don't leave scars. But they can in severe cases. Or if someone has a weak immune system.    How is shingles treated?  For most people, shingles heals on its own in a few days or weeks. But treatment is advised to help ease pain, speed healing, and reduce the risk of complications. Antiviral medicines are most often only prescribed if you are seen by a healthcare provider within the first 72 hours of having the rash. But antiviral medicines may be prescribed even after 72 hours if someone's immune system is weak. Or if the infection is extensive, severe, or isn't going away. To reduce symptoms:     Apply ice packs or cool compresses, or soak in a cool bath. To make an ice pack, put ice cubes in a plastic bag that seals at the top. Wrap the bag in a clean, thin towel or cloth. Never put ice or an ice pack directly on the skin.    Use calamine lotion to calm itchy skin.    Ask your provider about over-the-counter pain relievers. If your pain is severe, your provider may prescribe stronger pain medicines.  What are possible complications of  shingles?   Shingles often goes away with no lasting effects. But some people have complications during or after the infection comes out:     Postherpetic neuralgia. This is the most common complication. It's more likely as people age, especially after age 60. It' is nerve pain at the place where the rash used to be. It can range from mild to severe. It can last for only a few days, or for months or even years after you have had shingles. Antiviral medicines given during the first 72 hours of the rash can reduce the chance of postherpetic neuralgia. Other medicines can be prescribed to help ease the pain and improve quality of life.    Bacterial infection. Shingles blisters may get infected with bacteria. Depending on the severity of the infection, topical, oral or IV (intravenous) antibiotic medicine is used to treat the infection.    Eye problems. If you have shingles on the face, see your healthcare provider right away. Shingles can cause serious problems with vision, and even blindness.  In very rare cases, shingles can also lead to pneumonia, hearing problems, brain inflammation, or even death.    When to get medical care  Call your healthcare provider if you have any of these:     New symptoms or symptoms that don t go away with treatment    A rash or blisters near your eye    More drainage, fever, or rash after treatment    Severe pain that doesn t go away  How can shingles be prevented?  You can only get shingles if you have had chickenpox in the past. Someone who never had chickenpox can get the virus from you. But instead of have shingles, the person may get chickenpox. Until your blisters form scabs, don't have any contact with others, especially the following:     Pregnant women who have never had chickenpox or the vaccine    Babies who were born early (premature) or who had low weight at birth    People with weak immune systems (such as people getting chemotherapy for cancer, people who have had organ  transplants, or people with HIV infections), particularly if they have never had chicken pox.  The shingles vaccine  The recombinant zoster vaccine (RZV) shingles vaccine is available to help prevent shingles or make it less painful.   You should get the RZV shingles vaccine if you are healthy and age 50 or older, even if you've had shingles in the past. Two shots of the RZV vaccine are recommended. You should get the second RZV shot 2 to 6 months after the first. The vaccine makes it less likely that you will develop shingles. If you do develop shingles, your symptoms will likely be milder than if you hadn t been vaccinated. RZV is also advised even if you had the older shingles vaccine (zoster live vaccine, ZVL) in the past. That's because the RZV vaccine works better and protects you from shingles longer.   Talk with your healthcare provider about the best time to get vaccinated.   [employer] last reviewed this educational content on 6/1/2019 2000-2021 [employer]. All rights reserved. This information is not intended as a substitute for professional medical care. Always follow your healthcare professional's instructions.

## 2022-01-08 ENCOUNTER — HEALTH MAINTENANCE LETTER (OUTPATIENT)
Age: 67
End: 2022-01-08

## 2022-11-19 ENCOUNTER — HEALTH MAINTENANCE LETTER (OUTPATIENT)
Age: 67
End: 2022-11-19

## 2023-04-09 ENCOUNTER — HEALTH MAINTENANCE LETTER (OUTPATIENT)
Age: 68
End: 2023-04-09

## 2024-06-16 ENCOUNTER — HEALTH MAINTENANCE LETTER (OUTPATIENT)
Age: 69
End: 2024-06-16